# Patient Record
Sex: MALE | Race: WHITE | Employment: UNEMPLOYED | URBAN - METROPOLITAN AREA
[De-identification: names, ages, dates, MRNs, and addresses within clinical notes are randomized per-mention and may not be internally consistent; named-entity substitution may affect disease eponyms.]

---

## 2024-06-24 ENCOUNTER — OFFICE VISIT (OUTPATIENT)
Dept: URGENT CARE | Facility: CLINIC | Age: 39
End: 2024-06-24
Payer: COMMERCIAL

## 2024-06-24 VITALS
HEART RATE: 72 BPM | WEIGHT: 169 LBS | SYSTOLIC BLOOD PRESSURE: 117 MMHG | BODY MASS INDEX: 22.89 KG/M2 | HEIGHT: 72 IN | TEMPERATURE: 97.2 F | RESPIRATION RATE: 18 BRPM | OXYGEN SATURATION: 100 % | DIASTOLIC BLOOD PRESSURE: 78 MMHG

## 2024-06-24 DIAGNOSIS — Z23 ENCOUNTER FOR IMMUNIZATION: ICD-10-CM

## 2024-06-24 DIAGNOSIS — S61.411A LACERATION OF RIGHT HAND WITHOUT FOREIGN BODY, INITIAL ENCOUNTER: Primary | ICD-10-CM

## 2024-06-24 PROCEDURE — 90715 TDAP VACCINE 7 YRS/> IM: CPT

## 2024-06-24 PROCEDURE — G0382 LEV 3 HOSP TYPE B ED VISIT: HCPCS | Performed by: PHYSICIAN ASSISTANT

## 2024-06-24 PROCEDURE — 90715 TDAP VACCINE 7 YRS/> IM: CPT | Performed by: PHYSICIAN ASSISTANT

## 2024-06-24 NOTE — PROGRESS NOTES
[facility]        NAME: Mayank Vargas is a 39 y.o. male  : 1985    MRN: [id number]  DATE: 2024  TIME: 7:19 PM      Assessment and Plan     Laceration of right hand without foreign body, initial encounter [S61.411A]  1. Laceration of right hand without foreign body, initial encounter        2. Encounter for immunization  Tdap Vaccine greater than or equal to 6yo        Note:   Wound not deep enough to cause nerve injury, so not really concerned about the tingling feeling.   Wound clean and out of window for repair, so left to heal by secondary intention   Updated tetanus vaccine today     Patient Instructions   There are no Patient Instructions on file for this visit.     Follow up with primary care provider.   Go to ER if symptoms worsen.    Chief Complaint     Chief Complaint   Patient presents with    Wound Check     Patient reported he is here for a wound check on RT hand that happened yesterday. Patient reported that he was pushing the trash bag down, and there was a aluminum can lid that cut his hand. Patient reported that wound was cleaned, and once it stopped bleeding they glued it shut. Patient denied any pain but has occasional numbness. Patient is unsure when his last Tetanus vaccine was.          History of Present Illness     Patient presents with a laceration to his right hand between the thumb and index finger x yesterday. He states he cut it on an aluminum can. He cleaned the wound with alcohol and glued it with OTC liquid bandage. He states there is occasional numbness/tingling in his index finger. He does not know when his last tetanus vaccine was.         Review of Systems     Review of Systems   Constitutional:  Negative for chills, fatigue and fever.   HENT:  Negative for congestion, ear pain, postnasal drip, rhinorrhea, sinus pressure, sinus pain, sneezing and sore throat.    Eyes:  Negative for pain and visual disturbance.   Respiratory:  Negative for cough and  shortness of breath.    Cardiovascular:  Negative for chest pain and palpitations.   Gastrointestinal:  Negative for abdominal pain, diarrhea, nausea and vomiting.   Genitourinary:  Negative for dysuria and hematuria.   Musculoskeletal:  Negative for arthralgias, back pain and myalgias.   Skin:  Positive for wound. Negative for rash.   Neurological:  Negative for dizziness, seizures, syncope, numbness and headaches.   All other systems reviewed and are negative.        Current Medications     No current outpatient medications on file.    Current Allergies     Allergies as of 06/24/2024    (No Known Allergies)              The following portions of the patient's history were reviewed and updated as appropriate: allergies, current medications, past family history, past medical history, past social history, past surgical history, and problem list.     History reviewed. No pertinent past medical history.    Past Surgical History:   Procedure Laterality Date    SURGERY SCROTAL / TESTICULAR Bilateral        History reviewed. No pertinent family history.      Medications have been verified.        Objective     /78   Pulse 72   Temp (!) 97.2 °F (36.2 °C) (Tympanic)   Resp 18   Ht 6' (1.829 m)   Wt 76.7 kg (169 lb)   SpO2 100%   BMI 22.92 kg/m²   No LMP for male patient.         Physical Exam     Physical Exam  Vitals and nursing note reviewed. Exam conducted with a chaperone present (friend).   Constitutional:       Appearance: Normal appearance. He is normal weight.   HENT:      Head: Normocephalic and atraumatic.   Cardiovascular:      Rate and Rhythm: Normal rate.   Pulmonary:      Effort: Pulmonary effort is normal.   Skin:     General: Skin is warm and dry.      Comments: Right hand: over second MCP, 2cm linear laceration. Clean and dry. Bleeding controlled. Small clear film over wound (liquid bandage). No signs of infection. No foreign bodies.    Neurological:      General: No focal deficit present.       Mental Status: He is alert and oriented to person, place, and time.   Psychiatric:         Mood and Affect: Mood normal.         Behavior: Behavior normal.